# Patient Record
Sex: FEMALE | Race: WHITE | Employment: FULL TIME | URBAN - METROPOLITAN AREA
[De-identification: names, ages, dates, MRNs, and addresses within clinical notes are randomized per-mention and may not be internally consistent; named-entity substitution may affect disease eponyms.]

---

## 2019-03-01 ENCOUNTER — HOSPITAL ENCOUNTER (OUTPATIENT)
Dept: MAMMOGRAPHY | Age: 49
Discharge: HOME | End: 2019-03-03
Payer: COMMERCIAL

## 2019-03-01 DIAGNOSIS — Z12.39 BREAST CANCER SCREENING: ICD-10-CM

## 2019-03-01 PROCEDURE — 77067 SCR MAMMO BI INCL CAD: CPT

## 2020-02-07 ENCOUNTER — HOSPITAL ENCOUNTER (EMERGENCY)
Age: 50
Discharge: HOME | End: 2020-02-07
Attending: EMERGENCY MEDICINE
Payer: COMMERCIAL

## 2020-02-07 ENCOUNTER — APPOINTMENT (OUTPATIENT)
Dept: GENERAL RADIOLOGY | Age: 50
End: 2020-02-07
Payer: COMMERCIAL

## 2020-02-07 VITALS
RESPIRATION RATE: 20 BRPM | WEIGHT: 178 LBS | TEMPERATURE: 98.1 F | SYSTOLIC BLOOD PRESSURE: 119 MMHG | DIASTOLIC BLOOD PRESSURE: 70 MMHG | OXYGEN SATURATION: 97 % | HEART RATE: 79 BPM

## 2020-02-07 LAB
BASOPHILS ABSOLUTE: 0.04 E9/L (ref 0–0.2)
BASOPHILS RELATIVE PERCENT: 0.6 % (ref 0–2)
EOSINOPHILS ABSOLUTE: 0.03 E9/L (ref 0.05–0.5)
EOSINOPHILS RELATIVE PERCENT: 0.5 % (ref 0–6)
GFR AFRICAN AMERICAN: >60
GFR NON-AFRICAN AMERICAN: >60 ML/MIN/1.73
GLUCOSE BLD-MCNC: 87 MG/DL (ref 74–99)
HCT VFR BLD CALC: 38.4 % (ref 34–48)
HEMOGLOBIN: 13.2 G/DL (ref 11.5–15.5)
IMMATURE GRANULOCYTES #: 0.01 E9/L
IMMATURE GRANULOCYTES %: 0.2 % (ref 0–5)
LYMPHOCYTES ABSOLUTE: 1.91 E9/L (ref 1.5–4)
LYMPHOCYTES RELATIVE PERCENT: 29.5 % (ref 20–42)
MCH RBC QN AUTO: 32.2 PG (ref 26–35)
MCHC RBC AUTO-ENTMCNC: 34.4 % (ref 32–34.5)
MCV RBC AUTO: 93.7 FL (ref 80–99.9)
MONOCYTES ABSOLUTE: 0.55 E9/L (ref 0.1–0.95)
MONOCYTES RELATIVE PERCENT: 8.5 % (ref 2–12)
NEUTROPHILS ABSOLUTE: 3.93 E9/L (ref 1.8–7.3)
NEUTROPHILS RELATIVE PERCENT: 60.7 % (ref 43–80)
PDW BLD-RTO: 13.1 FL (ref 11.5–15)
PLATELET # BLD: 263 E9/L (ref 130–450)
PMV BLD AUTO: 11 FL (ref 7–12)
POC CHLORIDE: 105 MMOL/L (ref 100–108)
POC CREATININE: 0.7 MG/DL (ref 0.5–1)
POC POTASSIUM: 3.4 MMOL/L (ref 3.5–5)
POC SODIUM: 141 MMOL/L (ref 132–146)
RBC # BLD: 4.1 E12/L (ref 3.5–5.5)
WBC # BLD: 6.5 E9/L (ref 4.5–11.5)

## 2020-02-07 PROCEDURE — 6370000000 HC RX 637 (ALT 250 FOR IP): Performed by: EMERGENCY MEDICINE

## 2020-02-07 PROCEDURE — 93005 ELECTROCARDIOGRAM TRACING: CPT | Performed by: EMERGENCY MEDICINE

## 2020-02-07 PROCEDURE — G0384 LEV 5 HOSP TYPE B ED VISIT: HCPCS

## 2020-02-07 PROCEDURE — 36415 COLL VENOUS BLD VENIPUNCTURE: CPT

## 2020-02-07 PROCEDURE — 85025 COMPLETE CBC W/AUTO DIFF WBC: CPT

## 2020-02-07 PROCEDURE — 82435 ASSAY OF BLOOD CHLORIDE: CPT

## 2020-02-07 PROCEDURE — 84132 ASSAY OF SERUM POTASSIUM: CPT

## 2020-02-07 PROCEDURE — 71046 X-RAY EXAM CHEST 2 VIEWS: CPT

## 2020-02-07 PROCEDURE — 84295 ASSAY OF SERUM SODIUM: CPT

## 2020-02-07 PROCEDURE — 82565 ASSAY OF CREATININE: CPT

## 2020-02-07 PROCEDURE — 82947 ASSAY GLUCOSE BLOOD QUANT: CPT

## 2020-02-07 RX ORDER — ALBUTEROL SULFATE 90 UG/1
2 AEROSOL, METERED RESPIRATORY (INHALATION) 4 TIMES DAILY PRN
Qty: 1 INHALER | Refills: 0 | Status: SHIPPED | OUTPATIENT
Start: 2020-02-07

## 2020-02-07 RX ORDER — CITALOPRAM 40 MG/1
40 TABLET ORAL DAILY
COMMUNITY

## 2020-02-07 RX ORDER — METHYLPREDNISOLONE 4 MG/1
TABLET ORAL
Qty: 1 KIT | Refills: 0 | Status: SHIPPED | OUTPATIENT
Start: 2020-02-07 | End: 2020-02-13

## 2020-02-07 RX ORDER — IPRATROPIUM BROMIDE AND ALBUTEROL SULFATE 2.5; .5 MG/3ML; MG/3ML
1 SOLUTION RESPIRATORY (INHALATION) ONCE
Status: COMPLETED | OUTPATIENT
Start: 2020-02-07 | End: 2020-02-07

## 2020-02-07 RX ADMIN — IPRATROPIUM BROMIDE AND ALBUTEROL SULFATE 1 AMPULE: .5; 3 SOLUTION RESPIRATORY (INHALATION) at 14:43

## 2020-02-07 ASSESSMENT — ENCOUNTER SYMPTOMS
EYES NEGATIVE: 1
GASTROINTESTINAL NEGATIVE: 1
WHEEZING: 1
ALLERGIC/IMMUNOLOGIC NEGATIVE: 1
SHORTNESS OF BREATH: 1
CHEST TIGHTNESS: 1

## 2020-02-07 NOTE — ED PROVIDER NOTES
Basophils % 0.6 0.0 - 2.0 %    Neutrophils Absolute 3.93 1.80 - 7.30 E9/L    Immature Granulocytes # 0.01 E9/L    Lymphocytes Absolute 1.91 1.50 - 4.00 E9/L    Monocytes Absolute 0.55 0.10 - 0.95 E9/L    Eosinophils Absolute 0.03 (L) 0.05 - 0.50 E9/L    Basophils Absolute 0.04 0.00 - 0.20 E9/L   POCT Venous   Result Value Ref Range    POC Sodium 141 132 - 146 mmol/L    POC Potassium 3.4 (L) 3.5 - 5.0 mmol/L    POC Chloride 105 100 - 108 mmol/L    POC Glucose 87 74 - 99 mg/dl    POC Creatinine 0.7 0.5 - 1.0 mg/dL    GFR Non-African American >60 >=60 mL/min/1.73    GFR African American >60    EKG 12 Lead   Result Value Ref Range    Ventricular Rate 86 BPM    Atrial Rate 86 BPM    P-R Interval 166 ms    QRS Duration 64 ms    Q-T Interval 378 ms    QTc Calculation (Bazett) 452 ms    P Axis 71 degrees    R Axis 75 degrees    T Axis 70 degrees     XR CHEST STANDARD (2 VW)   Final Result   1. No active cardiopulmonary disease.           ------------------------- NURSING NOTES AND VITALS REVIEWED ---------------------------   The nursing notes within the ED encounter and vital signs as below have been reviewed. /70   Pulse 79   Temp 98.1 °F (36.7 °C) (Oral)   Resp 20   Wt 178 lb (80.7 kg)   LMP 01/25/2020   SpO2 97%   Oxygen Saturation Interpretation: Normal      ------------------------------------------ PROGRESS NOTES ------------------------------------------   I have spoken with the patient and discussed todays results, in addition to providing specific details for the plan of care and counseling regarding the diagnosis and prognosis. Their questions are answered at this time and they are agreeable with the plan.      --------------------------------- ADDITIONAL PROVIDER NOTES ---------------------------------        This patient is stable for discharge. I have shared the specific conditions for return, as well as the importance of follow-up. IMPRESSION:     1.  [facility] Patient's Medications   New Prescriptions    ALBUTEROL SULFATE  (90 BASE) MCG/ACT INHALER    Inhale 2 puffs into the lungs 4 times daily as needed for Wheezing    METHYLPREDNISOLONE (MEDROL, KLAUDIA,) 4 MG TABLET    USE AS DIRECTED  DISPENSE ONE PACK  NO REFILLS   Previous Medications    BREXPIPRAZOLE (REXULTI) 3 MG TABS TABLET    Take 3 mg by mouth daily    CITALOPRAM (CELEXA) 40 MG TABLET    Take 40 mg by mouth daily   Modified Medications    No medications on file   Discontinued Medications    No medications on file       Physical Exam  Vitals signs and nursing note reviewed. Constitutional:       Appearance: Normal appearance. HENT:      Head: Normocephalic. Nose: Nose normal.      Mouth/Throat:      Mouth: Mucous membranes are dry. Neck:      Musculoskeletal: Normal range of motion and neck supple. Cardiovascular:      Rate and Rhythm: Normal rate and regular rhythm. Pulses: Normal pulses. Pulmonary:      Effort: Pulmonary effort is normal. No respiratory distress. Breath sounds: No stridor. Wheezing present. No rhonchi. Chest:      Chest wall: No tenderness. Abdominal:      Palpations: Abdomen is soft. Musculoskeletal: Normal range of motion. Skin:     General: Skin is warm. Capillary Refill: Capillary refill takes less than 2 seconds. Neurological:      General: No focal deficit present. Mental Status: She is alert and oriented to person, place, and time. Psychiatric:         Mood and Affect: Mood normal.          Procedures   Xr Chest Standard (2 Vw)    Result Date: 2/7/2020  LOCATION: 200 EXAM: XR CHEST (2 VW) COMPARISON: None HISTORY: Shortness of breath TECHNIQUE: PA and lateral  views of the chest were obtained. FINDINGS:  SUPPORT DEVICES: None. LUNGS: Clear with no areas of consolidation. No lung nodules. PLEURA: No pneumothorax visualized. LUNG VOLUMES: Satisfactory inspirator effort. MEDIASTINAL STRUCTURES: No lymphadenopathy.  Normal aortic

## 2020-02-08 LAB
EKG ATRIAL RATE: 86 BPM
EKG P AXIS: 71 DEGREES
EKG P-R INTERVAL: 166 MS
EKG Q-T INTERVAL: 378 MS
EKG QRS DURATION: 64 MS
EKG QTC CALCULATION (BAZETT): 452 MS
EKG R AXIS: 75 DEGREES
EKG T AXIS: 70 DEGREES
EKG VENTRICULAR RATE: 86 BPM

## 2020-02-13 ENCOUNTER — HOSPITAL ENCOUNTER (OUTPATIENT)
Dept: NON INVASIVE DIAGNOSTICS | Age: 50
Discharge: HOME | End: 2020-02-13
Payer: COMMERCIAL

## 2020-02-13 ENCOUNTER — HOSPITAL ENCOUNTER (OUTPATIENT)
Dept: NUCLEAR MEDICINE | Age: 50
Discharge: HOME | End: 2020-02-13
Payer: COMMERCIAL

## 2020-02-13 LAB
LV EF: 73 %
LVEF MODALITY: NORMAL

## 2020-02-13 PROCEDURE — 93017 CV STRESS TEST TRACING ONLY: CPT

## 2020-02-13 PROCEDURE — 3430000000 HC RX DIAGNOSTIC RADIOPHARMACEUTICAL: Performed by: RADIOLOGY

## 2020-02-13 PROCEDURE — 78452 HT MUSCLE IMAGE SPECT MULT: CPT

## 2020-02-13 PROCEDURE — A9500 TC99M SESTAMIBI: HCPCS | Performed by: RADIOLOGY

## 2020-02-13 RX ADMIN — Medication 30 MILLICURIE: at 10:24

## 2020-02-13 RX ADMIN — Medication 10 MILLICURIE: at 08:34

## 2020-02-14 NOTE — PROCEDURES
recommended with nuclear imaging.         Mary Ann Arce DO    D: 02/13/2020 20:25:25       T: 02/13/2020 20:33:53     [email or web address]  Job#: [id number]     Doc#: [id number]    CC:  Jen Barbosa MD

## 2022-01-25 ENCOUNTER — HOSPITAL ENCOUNTER (OUTPATIENT)
Dept: MAMMOGRAPHY | Age: 52
Discharge: HOME | End: 2022-01-27
Payer: COMMERCIAL

## 2022-01-25 VITALS — BODY MASS INDEX: 29.51 KG/M2 | WEIGHT: 188 LBS | HEIGHT: 67 IN

## 2022-01-25 DIAGNOSIS — Z12.31 ENCOUNTER FOR SCREENING MAMMOGRAM FOR MALIGNANT NEOPLASM OF BREAST: ICD-10-CM

## 2022-01-25 PROCEDURE — 77067 SCR MAMMO BI INCL CAD: CPT

## 2022-04-06 ENCOUNTER — OFFICE VISIT (OUTPATIENT)
Dept: BARIATRICS/WEIGHT MGMT | Age: 52
End: 2022-04-06
Payer: COMMERCIAL

## 2022-04-06 VITALS
HEART RATE: 85 BPM | DIASTOLIC BLOOD PRESSURE: 70 MMHG | TEMPERATURE: 97.9 F | SYSTOLIC BLOOD PRESSURE: 128 MMHG | HEIGHT: 66 IN | WEIGHT: 190 LBS | BODY MASS INDEX: 30.53 KG/M2

## 2022-04-06 DIAGNOSIS — E66.09 CLASS 1 OBESITY DUE TO EXCESS CALORIES WITHOUT SERIOUS COMORBIDITY WITH BODY MASS INDEX (BMI) OF 30.0 TO 30.9 IN ADULT: ICD-10-CM

## 2022-04-06 DIAGNOSIS — F32.4 MAJOR DEPRESSIVE DISORDER IN PARTIAL REMISSION, UNSPECIFIED WHETHER RECURRENT (HCC): Primary | ICD-10-CM

## 2022-04-06 PROBLEM — F32.A DEPRESSION: Status: ACTIVE | Noted: 2022-04-06

## 2022-04-06 PROCEDURE — 99204 OFFICE O/P NEW MOD 45 MIN: CPT | Performed by: INTERNAL MEDICINE

## 2022-04-06 PROCEDURE — 99202 OFFICE O/P NEW SF 15 MIN: CPT

## 2022-04-06 RX ORDER — PHENTERMINE HYDROCHLORIDE 37.5 MG/1
37.5 TABLET ORAL
Qty: 30 TABLET | Refills: 0 | Status: SHIPPED | OUTPATIENT
Start: 2022-04-06 | End: 2022-05-06 | Stop reason: SDUPTHER

## 2022-04-06 RX ORDER — PHENTERMINE HYDROCHLORIDE 37.5 MG/1
37.5 TABLET ORAL
Qty: 30 TABLET | Refills: 0 | Status: SHIPPED | OUTPATIENT
Start: 2022-04-06 | End: 2022-04-06

## 2022-04-06 NOTE — PROGRESS NOTES
CC -   Depression, weight gain    BACKGROUND -     First visit: 4/6/22     Obesity (all weight in lbs)  Began 1 year ago  Initial BMI 30.67, Wt 190.0, Ht 66\"  HS Grad wt 150   Lowest   wt 130 (20 yrs ago)   Highest  wt 190  Pattern of wt gain: rapid in the last one year  Wt change past yr: +30 lbs  Most wt lost: 20 lbs  Other diets attempted: watched diet, low fat, exercised more    Desire to lose weight: 10/10    Initial Diet:    Number of meals per day - 2 (L&D)    Number of snacks per day - 1    Meal volume - 12\" plate,  occasionally seconds    Fast food/convenience store - 2x/week    Restaurants (not fast food) - 0-1x/week   Sweets - 0d/week   Chips - 2d/week   Crackers/pretzels - 2d/week   Nuts - 5d/week (almonds about a handful a day)   Peanut Butter - 0d/week   Popcorn - 0d/week   Dried fruit - 0d/week   Whole fruit - 3d/week (bananas and oranges usu)   Breakfast cereal - 0d/week   Granola/Protein/Energy bar - 4d/week (nutrigrain)   Sugar sweetened beverages - No. coffee ~4 cups with splenda. Water ~48 oz/day. Protein - No supplements   Fiber - No supplements     Initial Exercise:    Gym membership - yes, [employer] in [place]    Walking - no    Running - no    Resistance - no    Aerobic class - no        Initial Sleep: Bedtime: 8 pm, wake up time: 5 am - usu tired (frequent awakenings at night), daytime naps: no    Weight scale at home: yes, takes weight: <1x/wk. Food scale: no  ______________________    [facility] -  Past Medical History:   Diagnosis Date    Depression      Past Surgical History:   Procedure Laterality Date    BREAST LUMPECTOMY  ? 10 years ago    LEFT     Prior to Admission medications    Medication Sig Start Date End Date Taking?  Authorizing Provider   citalopram (CELEXA) 40 MG tablet Take 40 mg by mouth daily    Historical Provider, MD   brexpiprazole (REXULTI) 3 MG TABS tablet Take 3 mg by mouth daily    Historical Provider, MD   albuterol sulfate  (90 Base) MCG/ACT inhaler Inhale 2 puffs into the lungs 4 times daily as needed for Wheezing 20   Rasheed Dorantes DO     Family history: DM: mother, Heart disease: parents. Allergies: No Known Allergies    Social history: smokin/2 ppd x 20 yrs ; Alcohol: occasional , work: LPN, has to walk. ROS -  Card - no CP  GI - no N/V/D/C    PE -  Gen : /70 (Site: Left Upper Arm, Position: Sitting, Cuff Size: Large Adult)   Pulse 85   Temp 97.9 °F (36.6 °C) (Temporal)   Ht 5' 6\" (1.676 m)   Wt 190 lb (86.2 kg)   LMP 2022 (Approximate)   BMI 30.67 kg/m²    WN, WD, NAD  Lung: Nml resp effort, CTA B/L  Heart:  RRR w/o MGR, no LE pitting edema b/l  Psych: Normal mood   Full affect  Neuro: Moves all ext well    TSH was WNL at patient' OBGYN office.  ______________________    HISTORY & ASSESSMENT/PLAN -     Problem 1 - Depression  HPI  - ongoing, on Celexa and Rexulti, patient feels depressed about her weight gain  Assessment - fairly controlled  Plan  - continue medication. Weight reduction may help with depression. Problem 2  - Obesity   HPI   - See above Background for description    Weight  Date    190.0  22  DEN (est.)= 2111 Jack/d = 21604 Jack/wk  Wt effect of HR foods  = 700 Jack/wk = 100 Jack/d= 4% DEN = 10 lb/year. Assessment  - Uncontrolled  Plan   - Talked about various options. Does not want VLCD. Would like calorie counting with low calorie diet as detailed below. Would like an appetite suppressant, and after talking about options and side effects, opted for Phentermine. Will also try to obtain last bloodwork result (was done recently at obgyn office per patient).  Planned as follows:  Patient Instructions   Rules:  · Count every calorie every day  · Limit sweets to one day per month  · Limit chips/crackers/pretzels/nuts/popcorn to 100 jack/day  · Eliminate all sugar sweetened beverages (including fruit juice)  · Limit restaurants (including fast food and food from a convenience store) to one time every two weeks while in town    Requirements:  · Make sure protein intake is at least 70 grams per day (do not count protein every day; instead spot check your intake every 2-3 weeks and make sure what you think you are getting is close to accurate; consider using a protein shake if needed; these are in the pharmacy section of the stores, not the grocery section; Premier, Pure Protein and Fairlife are relatively inexpensive and taste good to most patients; other options are Nectar, Boost Max, Ensure Max, BeneProtein and GNC lean (which is lactose-free); Nectar fruit, Premier Protein Clear, IsoPure Protein Drink, and Protein 2 O are water-based options; Quest (or Cosco, which is cheaper and is ordered on [employer]) and the [employer] 1 protein bars can also be used, but have less protein in them )  (Disclaimer: Dietary supplements rarely have their listed ingredients and the amount of each verified by a third party other. Sometimes they give verification for their claims to be GMO and gluten free and to be organic. However, even such verifications as these may still be untrustworthy.) (<200 Jack, >25 g protein)    · Make sure that fiber intake is at least 22 grams per day. Do this by either eating 12 tablespoons of the original, plain Fiber One cereal every day or 4 tablespoons of wheat dextrin powder (Benefiber or a generic brand) every day. Work up to this amount slowly by starting with only one-eighth to one-fourth of the target amount and then adding another one-eighth to one-fourth every one or two weeks until reaching the target. · Take one multivitamin every day    Targets:  · Limit calorie intake to 1400 calories/day  · Walk 30 minutes daily or equivalent (210 min/week)  · Avoid eating 2 hours within bedtime.      Tips:  · Do not eat outside of the dining room or the kitchen  · Do not eat while watching TV, videos, working on the computer or using a smart phone  · Do not eat food out of a multi-serving bag or container. · Establish 6 hours of food-free \"time-out\" periods (times you don't eat) each day. No period can be less than 1 hour long. The periods need to be the same every day for days that are the same (for example, workdays would have one set of food free periods and weekends would have another set of days). These six hours are in addition to the two hours before bedtime and the time spent sleeping. Phentermine:  Take phentermine 37.5 mg, one-half to one tablet daily as needed for appetite suppression. Take each dose 30-90 min before effect will be needed. While taking phentermine, check the Blood Pressure every morning and every evening. If the systolic BP is >898 mmHg, the diastolic BP is >39 mm/Hg or the heart rate is > 100 beats per minute, do not take phentermine that day. If the systolic BP is consistently >155 mmHg, the diastolic BP is consistently above 90 mm/Hg or the heart rate is consistently > 100 beats per minute, then stop taking phentermine altogether. If the systolic BP >968 mmHg or the diastolic BP is >172 mmHg (even if it is only once), then phentermine should be stopped altogether without proving that any of these are consistently elevated. Return to see me in 1 month. Orders Placed This Encounter   Medications    DISCONTD: phentermine (ADIPEX-P) 37.5 MG tablet     Sig: Take 1 tablet by mouth every morning (before breakfast) for 30 days. Dispense:  30 tablet     Refill:  0    phentermine (ADIPEX-P) 37.5 MG tablet     Sig: Take 1 tablet by mouth every morning (before breakfast) for 30 days. Dispense:  30 tablet     Refill:  0      Total time spent on encounter: 50 min. Fred Wyman MD  Internal Medicine/Obesity Medicine  4/6/2022.

## 2022-04-06 NOTE — PATIENT INSTRUCTIONS
Rules:  · Count every calorie every day  · Limit sweets to one day per month  · Limit chips/crackers/pretzels/nuts/popcorn to 100 jack/day  · Eliminate all sugar sweetened beverages (including fruit juice)  · Limit restaurants (including fast food and food from a convenience store) to one time every two weeks while in town    Requirements:  · Make sure protein intake is at least 70 grams per day (do not count protein every day; instead spot check your intake every 2-3 weeks and make sure what you think you are getting is close to accurate; consider using a protein shake if needed; these are in the pharmacy section of the stores, not the grocery section; Premier, Pure Protein and Fairlife are relatively inexpensive and taste good to most patients; other options are Nectar, Boost Max, Ensure Max, BeneProtein and GNC lean (which is lactose-free); Nectar fruit, Premier Protein Clear, IsoPure Protein Drink, and Protein 2 O are water-based options; Quest (or Cosco, which is cheaper and is ordered on [address] [facility] [address]) and the [employer] 1 protein bars can also be used, but have less protein in them )  (Disclaimer: Dietary supplements rarely have their listed ingredients and the amount of each verified by a third party other. Sometimes they give verification for their claims to be GMO and gluten free and to be organic. However, even such verifications as these may still be untrustworthy.) (<200 Jack, >25 g protein)    · Make sure that fiber intake is at least 22 grams per day. Do this by either eating 12 tablespoons of the original, plain Fiber One cereal every day or 4 tablespoons of wheat dextrin powder (Benefiber or a generic brand) every day. Work up to this amount slowly by starting with only one-eighth to one-fourth of the target amount and then adding another one-eighth to one-fourth every one or two weeks until reaching the target.     · Take one multivitamin every day    Targets:  · Limit calorie intake to 1400 calories/day  · Walk 30 minutes daily or equivalent (210 min/week)  · Avoid eating 2 hours within bedtime. Tips:  · Do not eat outside of the dining room or the kitchen  · Do not eat while watching TV, videos, working on the computer or using a smart phone  · Do not eat food out of a multi-serving bag or container. · Establish 6 hours of food-free \"time-out\" periods (times you don't eat) each day. No period can be less than 1 hour long. The periods need to be the same every day for days that are the same (for example, workdays would have one set of food free periods and weekends would have another set of days). These six hours are in addition to the two hours before bedtime and the time spent sleeping. Phentermine:  Take phentermine 37.5 mg, one-half to one tablet daily as needed for appetite suppression. Take each dose 30-90 min before effect will be needed. While taking phentermine, check the Blood Pressure every morning and every evening. If the systolic BP is >632 mmHg, the diastolic BP is >88 mm/Hg or the heart rate is > 100 beats per minute, do not take phentermine that day. If the systolic BP is consistently >155 mmHg, the diastolic BP is consistently above 90 mm/Hg or the heart rate is consistently > 100 beats per minute, then stop taking phentermine altogether. If the systolic BP >136 mmHg or the diastolic BP is >555 mmHg (even if it is only once), then phentermine should be stopped altogether without proving that any of these are consistently elevated. Return to see me in 1 month.

## 2022-05-06 ENCOUNTER — OFFICE VISIT (OUTPATIENT)
Dept: BARIATRICS/WEIGHT MGMT | Age: 52
End: 2022-05-06
Payer: COMMERCIAL

## 2022-05-06 VITALS
SYSTOLIC BLOOD PRESSURE: 122 MMHG | DIASTOLIC BLOOD PRESSURE: 68 MMHG | TEMPERATURE: 98.1 F | BODY MASS INDEX: 31.31 KG/M2 | HEIGHT: 64 IN | HEART RATE: 92 BPM | WEIGHT: 183.4 LBS

## 2022-05-06 DIAGNOSIS — E66.09 CLASS 1 OBESITY DUE TO EXCESS CALORIES WITHOUT SERIOUS COMORBIDITY WITH BODY MASS INDEX (BMI) OF 31.0 TO 31.9 IN ADULT: ICD-10-CM

## 2022-05-06 DIAGNOSIS — F32.4 MAJOR DEPRESSIVE DISORDER IN PARTIAL REMISSION, UNSPECIFIED WHETHER RECURRENT (HCC): Primary | ICD-10-CM

## 2022-05-06 DIAGNOSIS — E78.1 HYPERTRIGLYCERIDEMIA: ICD-10-CM

## 2022-05-06 PROCEDURE — 99211 OFF/OP EST MAY X REQ PHY/QHP: CPT

## 2022-05-06 PROCEDURE — 99214 OFFICE O/P EST MOD 30 MIN: CPT | Performed by: INTERNAL MEDICINE

## 2022-05-06 RX ORDER — PHENTERMINE HYDROCHLORIDE 37.5 MG/1
37.5 TABLET ORAL
Qty: 30 TABLET | Refills: 0 | Status: SHIPPED | OUTPATIENT
Start: 2022-05-06 | End: 2022-06-05

## 2022-05-06 NOTE — PATIENT INSTRUCTIONS
Low calorie non-starchy vegetables:  Food (per 100 g) Calories Fibers T. Carbohydrates Protein Fat Sodium (mg) Potassium (mg)   Green Bell Peppers 10 1.7 4.6 0.9 0.2 3 175   Cucumbers 15 0.5 3.6 0.7 0.1 2 147   Celery 16 1.6 3 0.7 0.2 80 260   Tomatoes 18 1.2 3.9 0.9 0.2 5 237   Carrots 41 2.8 10 0.9 0.2 69 320   Cabbage 25 2.5 6 1.3 0.1 18 170   Broccoli 35 3.3 7.2 2.4 0.4 41 293   Cauliflower 23 2.3 4.1 1.8 0.5 15 142   Iceberg Lettuce 14 1.2 3 0.9 0.1 10 141   Kale 28 2 5.6 1.9 0.4 23 228   Brussel Sprouts 43 3.8 9 3.4 0.3 25 389   Spinach 23 2.4 3.8 3 0.3 70 466   Zucchini 15 1 2.7 1.1 0.4 3 264   Mushroom 28 2.2 5.3 2.2 0.5 2 356   Asparagus 22 2 4.1 2.4 0.2 14 224   Green Beans 35 3.2 7.9 1.9 0.3 1 146   Eggplant 35 2.5 8.7 0.8 0.2 1 123     Phentermine:  Take phentermine 37.5 mg, one-half to one tablet daily as needed for appetite suppression. Take each dose 30-90 min before effect will be needed. While taking phentermine, check the Blood Pressure every morning and every evening. If the systolic BP is >454 mmHg, the diastolic BP is >69 mm/Hg or the heart rate is > 100 beats per minute, do not take phentermine that day. If the systolic BP is consistently >155 mmHg, the diastolic BP is consistently above 90 mm/Hg or the heart rate is consistently > 100 beats per minute, then stop taking phentermine altogether. If the systolic BP >759 mmHg or the diastolic BP is >849 mmHg (even if it is only once), then phentermine should be stopped altogether without proving that any of these are consistently elevated. Follow up in 1 month.

## 2022-05-06 NOTE — PROGRESS NOTES
CC -   Follow up of: Depression, weight gain    BACKGROUND -   Last visit: 4/6/22  First visit: 4/6/22     Obesity (all weight in lbs)  Began 1 year ago  Initial BMI 32.61, Wt 190.0, Ht 64\"  HS Grad wt 150   Lowest   wt 130 (20 yrs ago)   Highest  wt 190  Pattern of wt gain: rapid in the last one year  Wt change past yr: +30 lbs  Most wt lost: 20 lbs  Other diets attempted: watched diet, low fat, exercised more    Desire to lose weight: 10/10    Initial Diet:    Number of meals per day - 2 (L&D)    Number of snacks per day - 1    Meal volume - 12\" plate,  occasionally seconds    Fast food/convenience store - 2x/week    Restaurants (not fast food) - 0-1x/week   Sweets - 0d/week   Chips - 2d/week   Crackers/pretzels - 2d/week   Nuts - 5d/week (almonds about a handful a day)   Peanut Butter - 0d/week   Popcorn - 0d/week   Dried fruit - 0d/week   Whole fruit - 3d/week (bananas and oranges usu)   Breakfast cereal - 0d/week   Granola/Protein/Energy bar - 4d/week (nutrigrain)   Sugar sweetened beverages - No. coffee ~4 cups with splenda. Water ~48 oz/day. Protein - No supplements   Fiber - No supplements     Initial Exercise:    Gym membership - yes, [employer] in [place]    Walking - no    Running - no    Resistance - no    Aerobic class - no        Initial Sleep: Bedtime: 8 pm, wake up time: 5 am - usu tired (frequent awakenings at night), daytime naps: no    Weight scale at home: yes, takes weight: <1x/wk. Food scale: no    Follow up 5/6/22:  1. Fatigue: denies    2. Diet: Protein shake in the morning. Feels she can decrease intake in the evening. Fiber supplement tablets (2 tablets = 1g). Water ~96 oz/day. 3. Exercise: walking during work, nothing outside the work. 4. Sleep: sleep has been the same. 5. Medications: Tolerating Phentermine, appetite suppression is 8/10. No change in other medications listed below.     ______________________    [facility] -  Past Medical History:   Diagnosis Date    Class 1 obesity due to excess calories without serious comorbidity with body mass index (BMI) of 30.0 to 30.9 in adult     Depression      Past Surgical History:   Procedure Laterality Date    BREAST LUMPECTOMY  ? 10 years ago    LEFT     Prior to Admission medications    Medication Sig Start Date End Date Taking? Authorizing Provider   phentermine (ADIPEX-P) 37.5 MG tablet Take 1 tablet by mouth every morning (before breakfast) for 30 days. 22  Reyna Cote MD   citalopram (CELEXA) 40 MG tablet Take 40 mg by mouth daily    Historical Provider, MD   brexpiprazole (REXULTI) 3 MG TABS tablet Take 3 mg by mouth daily    Historical Provider, MD   albuterol sulfate  (90 Base) MCG/ACT inhaler Inhale 2 puffs into the lungs 4 times daily as needed for Wheezing 20   Tere Hall, DO   MVI+, fibercon tablets. Family history: DM: mother, Heart disease: parents. 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  ______________________    HISTORY & ASSESSMENT/PLAN -     Problem 1 - Depression  HPI  - ongoing, on Celexa and Rexulti, overall doing better, feels depression is controlled with medications  Assessment - controlled  Plan  - continue medication. Weight reduction may help with depression.     Problem 2  - Obesity   HPI   - See above Background for description    Weight  Date    190.0  22    183.4  22    Total weight change to date: -6.6 lbs.  Average daily energy variance:   4/6/2022 - 5/6/2022: -6.6 lbs (16815 Jack)/29 days = -797 Jack/day deficit. DEN (est.)= 2111 Jack/d    Assessment  - Uncontrolled  Plan   - She is doing well with her current plan of low calorie diet. Talked about protein, fiber and water intake. She is tolerating Phentermine and would like to continue. List of low calorie non-starchy vegetables provided, that she can use for snacks if needed. Follow up planned in 1 month. Problem 3 - Hyperlipidemia  HPI  - recent lab result (1/18/22) with TG of 171, . Pt asymptomatic  Assessment - Uncontrolled  Plan  - Diet control. Weight reduction can help, planned as above. Orders Placed This Encounter   Medications    phentermine (ADIPEX-P) 37.5 MG tablet     Sig: Take 1 tablet by mouth every morning (before breakfast) for 30 days. Dispense:  30 tablet     Refill:  0      Saeid Lewis MD  Internal Medicine/Obesity Medicine  5/6/2022.

## 2022-06-06 ENCOUNTER — OFFICE VISIT (OUTPATIENT)
Dept: BARIATRICS/WEIGHT MGMT | Age: 52
End: 2022-06-06
Payer: COMMERCIAL

## 2022-06-06 VITALS
DIASTOLIC BLOOD PRESSURE: 57 MMHG | BODY MASS INDEX: 30.39 KG/M2 | TEMPERATURE: 97.9 F | HEIGHT: 64 IN | SYSTOLIC BLOOD PRESSURE: 108 MMHG | WEIGHT: 178 LBS | HEART RATE: 93 BPM

## 2022-06-06 DIAGNOSIS — E78.2 MODERATE MIXED HYPERLIPIDEMIA NOT REQUIRING STATIN THERAPY: ICD-10-CM

## 2022-06-06 DIAGNOSIS — E66.09 CLASS 1 OBESITY DUE TO EXCESS CALORIES WITHOUT SERIOUS COMORBIDITY WITH BODY MASS INDEX (BMI) OF 31.0 TO 31.9 IN ADULT: ICD-10-CM

## 2022-06-06 DIAGNOSIS — F32.4 MAJOR DEPRESSIVE DISORDER IN PARTIAL REMISSION, UNSPECIFIED WHETHER RECURRENT (HCC): Primary | ICD-10-CM

## 2022-06-06 PROCEDURE — 99211 OFF/OP EST MAY X REQ PHY/QHP: CPT

## 2022-06-06 PROCEDURE — 99214 OFFICE O/P EST MOD 30 MIN: CPT | Performed by: INTERNAL MEDICINE

## 2022-06-06 RX ORDER — PHENTERMINE HYDROCHLORIDE 37.5 MG/1
37.5 TABLET ORAL
Qty: 30 TABLET | Refills: 0 | Status: SHIPPED | OUTPATIENT
Start: 2022-06-06 | End: 2022-07-06

## 2022-06-06 NOTE — PATIENT INSTRUCTIONS
Continue current plan. Phentermine:  Take phentermine 37.5 mg, one-half to one tablet daily as needed for appetite suppression. Take each dose 30-90 min before effect will be needed. While taking phentermine, check the Blood Pressure every morning and every evening. If the systolic BP is >758 mmHg, the diastolic BP is >49 mm/Hg or the heart rate is > 100 beats per minute, do not take phentermine that day. If the systolic BP is consistently >155 mmHg, the diastolic BP is consistently above 90 mm/Hg or the heart rate is consistently > 100 beats per minute, then stop taking phentermine altogether. If the systolic BP >432 mmHg or the diastolic BP is >705 mmHg (even if it is only once), then phentermine should be stopped altogether without proving that any of these are consistently elevated. Follow up in 1 month.

## 2022-06-06 NOTE — PROGRESS NOTES
CC -   Follow up of: Depression, weight gain    BACKGROUND -   Last visit: 5/6/22  First visit: 4/6/22     Obesity (all weight in lbs)  Began 1 year ago  Initial BMI 32.61, Wt 190.0, Ht 64\"  HS Grad wt 150   Lowest   wt 130 (20 yrs ago)   Highest  wt 190  Pattern of wt gain: rapid in the last one year  Wt change past yr: +30 lbs  Most wt lost: 20 lbs  Other diets attempted: watched diet, low fat, exercised more    Desire to lose weight: 10/10    Initial Diet:    Number of meals per day - 2 (L&D)    Number of snacks per day - 1    Meal volume - 12\" plate,  occasionally seconds    Fast food/convenience store - 2x/week    Restaurants (not fast food) - 0-1x/week   Sweets - 0d/week   Chips - 2d/week   Crackers/pretzels - 2d/week   Nuts - 5d/week (almonds about a handful a day)   Peanut Butter - 0d/week   Popcorn - 0d/week   Dried fruit - 0d/week   Whole fruit - 3d/week (bananas and oranges usu)   Breakfast cereal - 0d/week   Granola/Protein/Energy bar - 4d/week (nutrigrain)   Sugar sweetened beverages - No. coffee ~4 cups with splenda. Water ~48 oz/day. Protein - No supplements   Fiber - No supplements     Initial Exercise:    Gym membership - yes, [employer] in [place]    Walking - no    Running - no    Resistance - no    Aerobic class - no        Initial Sleep: Bedtime: 8 pm, wake up time: 5 am - usu tired (frequent awakenings at night), daytime naps: no    Weight scale at home: yes, takes weight: <1x/wk. Food scale: no    Follow up 5/6/22:  1. Fatigue: denies    2. Diet: Protein shake in the morning. Feels she can decrease intake in the evening. Fiber supplement tablets (2 tablets = 1g). Water ~96 oz/day. 3. Exercise: walking during work, nothing outside the work. 4. Sleep: sleep has been the same. 5. Medications: Tolerating Phentermine, appetite suppression is 8/10. No change in other medications listed below. Follow up 6/6/22:  1. Fatigue: Denies    2. Diet: Vitafusion gummies - 4 gummies (10 g). 1/-3/4 low calorie non-starchy vegetables. 3. Exercise: yard work and walking during work. 4. Sleep: bedtime 8 pm, waking up at 5 am - similar to prior. 5. Medications: Tolerating Phentermine, appetite suppression 7/10. Other medications still the same. ______________________    [address] [place] [address] -  Past Medical History:   Diagnosis Date    Class 1 obesity due to excess calories without serious comorbidity with body mass index (BMI) of 30.0 to 30.9 in adult     Depression      Past Surgical History:   Procedure Laterality Date    BREAST LUMPECTOMY  ? 10 years ago    LEFT     Prior to Admission medications    Medication Sig Start Date End Date Taking? Authorizing Provider   citalopram (CELEXA) 40 MG tablet Take 40 mg by mouth daily    Historical Provider, MD   brexpiprazole (REXULTI) 3 MG TABS tablet Take 3 mg by mouth daily    Historical Provider, MD   albuterol sulfate  (90 Base) MCG/ACT inhaler Inhale 2 puffs into the lungs 4 times daily as needed for Wheezing 20   Melanie Hall, DO   MVI+, fibercon tablets. Family history: DM: mother, Heart disease: parents. Allergies: No Known Allergies    Social history: smokin/2 ppd x 20 yrs ; Alcohol: occasional , work: LPN, has to walk. ROS -  Card - no CP  GI - no N/V/D/C    PE -  Gen : BP (!) 108/57 (Site: Left Upper Arm, Position: Sitting, Cuff Size: Large Adult)   Pulse 93   Temp 97.9 °F (36.6 °C) (Temporal)   Ht 5' 4\" (1.626 m)   Wt 178 lb (80.7 kg)   BMI 30.55 kg/m²    WN, WD, NAD  Lung: Nml resp effort, CTA B/L  Heart:  RRR w/o MGR, no LE pitting edema b/l  Psych: Normal mood   Full affect  Neuro: Moves all ext well    TSH was WNL 2022.  ______________________    HISTORY & ASSESSMENT/PLAN -     Problem 1 - Depression  HPI  - ongoing, on Celexa and Rexulti, overall doing better, feels depression is controlled with medications  Assessment - controlled  Plan  - continue medication. Weight reduction may help with depression.     Problem 2 - Obesity   HPI   - See above Background for description    Weight  Date    190.0  4/6/22    183.4  5/6/22    178.0  6/6/22    Total weight change to date: -12 lbs. Average daily energy variance:   4/6/2022 - 5/6/2022: -6.6 lbs (90195 Jack)/29 days = -797 Jack/day deficit. 5/6/2022 - 6/6/2022: -5.4 lbs ([id number] Jack)/30 days = -630 Jack/day deficit. DEN (est.)= 2111 Jack/d    Assessment  - Uncontrolled  Plan   - She is doing well with her current plan of low calorie diet. Talked about protein, fiber and water intake. She is tolerating Phentermine and would like to continue. Follow up planned in 1 month. Problem 3 - Hyperlipidemia  HPI  - recent lab result (1/18/22) with TG of 171, . Pt asymptomatic  Assessment - Uncontrolled  Plan  - Diet control, can recheck in 1-6 months. Weight reduction can help, planned as above. Orders Placed This Encounter   Medications    phentermine (ADIPEX-P) 37.5 MG tablet     Sig: Take 1 tablet by mouth every morning (before breakfast) for 30 days. Dispense:  30 tablet     Refill:  0      Ashanti Da Silva MD  Internal Medicine/Obesity Medicine  6/6/2022.

## 2023-07-25 ENCOUNTER — HOSPITAL ENCOUNTER (OUTPATIENT)
Dept: MAMMOGRAPHY | Age: 53
Discharge: HOME | End: 2023-07-27
Attending: OBSTETRICS & GYNECOLOGY
Payer: COMMERCIAL

## 2023-07-25 ENCOUNTER — HOSPITAL ENCOUNTER (OUTPATIENT)
Dept: ULTRASOUND IMAGING | Age: 53
End: 2023-07-25
Attending: OBSTETRICS & GYNECOLOGY
Payer: COMMERCIAL

## 2023-07-25 VITALS — BODY MASS INDEX: 28.25 KG/M2 | HEIGHT: 67 IN | WEIGHT: 180 LBS

## 2023-07-25 DIAGNOSIS — N63.0 MASS OF BREAST, UNSPECIFIED LATERALITY: ICD-10-CM

## 2023-07-25 PROCEDURE — G0279 TOMOSYNTHESIS, MAMMO: HCPCS
